# Patient Record
Sex: MALE | Race: WHITE | NOT HISPANIC OR LATINO | URBAN - METROPOLITAN AREA
[De-identification: names, ages, dates, MRNs, and addresses within clinical notes are randomized per-mention and may not be internally consistent; named-entity substitution may affect disease eponyms.]

---

## 2020-12-28 ENCOUNTER — OFFICE VISIT (OUTPATIENT)
Dept: URBAN - METROPOLITAN AREA CLINIC 78 | Facility: CLINIC | Age: 60
End: 2020-12-28
Payer: COMMERCIAL

## 2020-12-28 VITALS
RESPIRATION RATE: 16 BRPM | BODY MASS INDEX: 36.51 KG/M2 | HEART RATE: 75 BPM | TEMPERATURE: 97.6 F | WEIGHT: 232.6 LBS | DIASTOLIC BLOOD PRESSURE: 87 MMHG | SYSTOLIC BLOOD PRESSURE: 134 MMHG | HEIGHT: 67 IN

## 2020-12-28 DIAGNOSIS — R68.2 DRY MOUTH: ICD-10-CM

## 2020-12-28 DIAGNOSIS — I25.9 CAD (CORONARY ARTERY DISEASE): ICD-10-CM

## 2020-12-28 DIAGNOSIS — C09.9 TONSIL CANCER: ICD-10-CM

## 2020-12-28 DIAGNOSIS — Z86.010 PERSONAL HISTORY OF COLON POLYPS: ICD-10-CM

## 2020-12-28 PROCEDURE — 99204 OFFICE O/P NEW MOD 45 MIN: CPT | Performed by: INTERNAL MEDICINE

## 2020-12-28 PROCEDURE — G8483 FLU IMM NO ADMIN DOC REA: HCPCS | Performed by: INTERNAL MEDICINE

## 2020-12-28 RX ORDER — SODIUM, POTASSIUM,MAG SULFATES 17.5-3.13G
177 ML DAY 1 AND 177 ML DAY 2 SOLUTION, RECONSTITUTED, ORAL ORAL
Qty: 354 MILLILITER | Refills: 0 | OUTPATIENT
Start: 2020-12-28

## 2020-12-28 RX ORDER — ASPIRIN 81 MG/1
1 TABLET TABLET, COATED ORAL ONCE A DAY
Status: ACTIVE | COMMUNITY

## 2020-12-28 RX ORDER — ROSUVASTATIN CALCIUM 20 MG/1
1 TABLET TABLET, FILM COATED ORAL ONCE A DAY
Status: ACTIVE | COMMUNITY

## 2020-12-28 NOTE — HPI-TODAY'S VISIT:
Patient was referred by Dr. Efren Jay  A copy of this document will be sent to the physician.  Personal history of Tonsil cancer s/p radiation and chemo  He has dry mouth and lak of taste  Chews gum  Denies heartburn or reflux  Oncologist is Dr Bland  ENT is Dr Burt    The patient presents for a colonoscopy . There is no family history of colon polyps or cancer. Patient denies change in bowel habits, appetite, Patient denies bleeding per rectum. Last colonoscopy: 2014 , TA and HP   Weight loss during Chemo : 55 lbs since March 2020    Personal hx of CAD with stent  in 1994  He is on baby aa  Cardiologist is Dr Santamaria  ( complete cardiology on [facility] )   Has seen his cardiologist .. was experiencing A fib   "[place] A fib " trigger was  etoh and caffiene

## 2021-02-09 ENCOUNTER — OFFICE VISIT (OUTPATIENT)
Dept: URBAN - METROPOLITAN AREA SURGERY CENTER 15 | Facility: SURGERY CENTER | Age: 61
End: 2021-02-09

## 2021-12-07 ENCOUNTER — WEB ENCOUNTER (OUTPATIENT)
Dept: URBAN - METROPOLITAN AREA CLINIC 78 | Facility: CLINIC | Age: 61
End: 2021-12-07

## 2021-12-13 ENCOUNTER — OFFICE VISIT (OUTPATIENT)
Dept: URBAN - METROPOLITAN AREA CLINIC 78 | Facility: CLINIC | Age: 61
End: 2021-12-13
Payer: COMMERCIAL

## 2021-12-13 ENCOUNTER — DASHBOARD ENCOUNTERS (OUTPATIENT)
Age: 61
End: 2021-12-13

## 2021-12-13 VITALS
HEIGHT: 67 IN | DIASTOLIC BLOOD PRESSURE: 75 MMHG | SYSTOLIC BLOOD PRESSURE: 118 MMHG | HEART RATE: 66 BPM | TEMPERATURE: 98 F | WEIGHT: 234 LBS | BODY MASS INDEX: 36.73 KG/M2

## 2021-12-13 DIAGNOSIS — Z86.010 PERSONAL HISTORY OF COLON POLYPS: ICD-10-CM

## 2021-12-13 DIAGNOSIS — I71.9 AORTIC ANEURYSM WITHOUT RUPTURE, UNSPECIFIED PORTION OF AORTA: ICD-10-CM

## 2021-12-13 DIAGNOSIS — I25.9 CAD (CORONARY ARTERY DISEASE): ICD-10-CM

## 2021-12-13 DIAGNOSIS — C09.9 TONSIL CANCER: ICD-10-CM

## 2021-12-13 DIAGNOSIS — R10.13 DYSPEPSIA: ICD-10-CM

## 2021-12-13 PROBLEM — 53741008 CORONARY ARTERY DISEASE: Status: ACTIVE | Noted: 2020-12-28

## 2021-12-13 PROBLEM — 363393007 MALIGNANT TUMOR OF TONSIL: Status: ACTIVE | Noted: 2020-12-28

## 2021-12-13 PROBLEM — 67362008: Status: ACTIVE | Noted: 2021-12-13

## 2021-12-13 PROCEDURE — 99214 OFFICE O/P EST MOD 30 MIN: CPT | Performed by: INTERNAL MEDICINE

## 2021-12-13 RX ORDER — VALSARTAN 80 MG/1
1 TABLET TABLET, FILM COATED ORAL ONCE A DAY
Status: ACTIVE | COMMUNITY

## 2021-12-13 RX ORDER — SODIUM, POTASSIUM,MAG SULFATES 17.5-3.13G
177 ML DAY 1 AND 177 ML DAY 2 SOLUTION, RECONSTITUTED, ORAL ORAL
Qty: 354 MILLILITER | Refills: 0 | OUTPATIENT

## 2021-12-13 RX ORDER — ROSUVASTATIN CALCIUM 20 MG/1
1 TABLET TABLET, FILM COATED ORAL ONCE A DAY
Status: ACTIVE | COMMUNITY

## 2021-12-13 RX ORDER — ASPIRIN 81 MG/1
1 TABLET TABLET, COATED ORAL ONCE A DAY
Status: ON HOLD | COMMUNITY

## 2021-12-13 RX ORDER — SODIUM, POTASSIUM,MAG SULFATES 17.5-3.13G
177 ML DAY 1 AND 177 ML DAY 2 SOLUTION, RECONSTITUTED, ORAL ORAL
Qty: 354 MILLILITER | Refills: 0 | Status: ON HOLD | COMMUNITY
Start: 2020-12-28

## 2021-12-13 NOTE — HPI-TODAY'S VISIT:
Patient was referred by Dr. Efren Jay  A copy of this document will be sent to the physician.  Patient reports sensitivity . Goes and comes with eating  Tried avoiding dairy  Tried stopping baby asa  (-) Reflux , dysphagia  Has gained weight since last visit  5 lbs \ Denies sleep disturbances  Uses Humidifier   Tried Zegerid which helped some  Personal history of Tonsil cancer s/p radiation and chemo  He has dry mouth and lack of taste  Recently he had chest with contrast imaging  ( [facility] ) , Previous revealed Aortic aneurysm ( 4 cm ) currently being moitored   Chews gum  Oncologist is Dr Bland  ENT is Dr Lieberman    The patient presents for a colonoscopy . There is no family history of colon polyps or cancer. Patient denies change in bowel habits, appetite, Patient denies bleeding per rectum. Last colonoscopy: 2014 , TA and HP   Weight loss during Chemo : 55 lbs since March 2020    Personal hx of CAD with stent  in 1994  He is on baby aa  Cardiologist is Dr Santamaria  ( complete cardiology on [facility] )   Has seen his cardiologist .. was experiencing A fib   "L[place] A fib " trigger was  etoh and caffiene

## 2021-12-14 PROBLEM — 428283002 HISTORY OF POLYP OF COLON: Status: ACTIVE | Noted: 2020-12-28

## 2021-12-17 ENCOUNTER — OFFICE VISIT (OUTPATIENT)
Dept: URBAN - METROPOLITAN AREA SURGERY CENTER 15 | Facility: SURGERY CENTER | Age: 61
End: 2021-12-17
Payer: COMMERCIAL

## 2021-12-17 ENCOUNTER — CLAIMS CREATED FROM THE CLAIM WINDOW (OUTPATIENT)
Dept: URBAN - METROPOLITAN AREA CLINIC 4 | Facility: CLINIC | Age: 61
End: 2021-12-17
Payer: COMMERCIAL

## 2021-12-17 DIAGNOSIS — K31.89 DUODENAL ERYTHEMA: ICD-10-CM

## 2021-12-17 DIAGNOSIS — K29.80 ACUTE DUODENITIS: ICD-10-CM

## 2021-12-17 DIAGNOSIS — K29.81 DUODENITIS WITH BLEEDING: ICD-10-CM

## 2021-12-17 DIAGNOSIS — K21.9 ACID REFLUX: ICD-10-CM

## 2021-12-17 DIAGNOSIS — Z86.010 ADENOMAS PERSONAL HISTORY OF COLONIC POLYPS: ICD-10-CM

## 2021-12-17 DIAGNOSIS — K21.9 GASTRO-ESOPHAGEAL REFLUX DISEASE WITHOUT ESOPHAGITIS: ICD-10-CM

## 2021-12-17 PROCEDURE — G8907 PT DOC NO EVENTS ON DISCHARG: HCPCS | Performed by: INTERNAL MEDICINE

## 2021-12-17 PROCEDURE — 88305 TISSUE EXAM BY PATHOLOGIST: CPT | Performed by: PATHOLOGY

## 2021-12-17 PROCEDURE — 88312 SPECIAL STAINS GROUP 1: CPT | Performed by: PATHOLOGY

## 2021-12-17 PROCEDURE — 43239 EGD BIOPSY SINGLE/MULTIPLE: CPT | Performed by: INTERNAL MEDICINE

## 2021-12-17 PROCEDURE — G0105 COLORECTAL SCRN; HI RISK IND: HCPCS | Performed by: INTERNAL MEDICINE
